# Patient Record
(demographics unavailable — no encounter records)

---

## 2025-01-16 NOTE — HISTORY OF PRESENT ILLNESS
[FreeTextEntry1] : 87 year old male with Dementia/HTN/HLD/T2DM/dysphagia found to have lymphocytic esophagitis in 2019, never started written budesonide in 2019, then was seen once in 2022 and was lost to follow-up until now here for complaints of recurrent dysphagia.  Daughter states that dysphagia is intermittent, unclear if both solids and liquids.  Daughter states that he will be fine for a while, and then have some episodes of choking.  He has not had any weight loss.  He also has an element of oropharyngeal dysphagia as seen on SLP eval in 2018 with some silent penetration without retrieval of thin liquids.  He also had a barium esophagram which demonstrated tablet hung up at the thoracic esophagus at that time. He currently is only taking pantoprazole once daily.

## 2025-01-16 NOTE — ASSESSMENT
[FreeTextEntry1] : 87-year-old male with HTN/HLD/T2DM/history of lymphocytic esophagitis here for resumption of GI care, last seen in 2022.  Patient with intermittent dysphagia to solids and liquids.  I recommend that he undergo barium esophagram with tablet challenge.  He will need an upper endoscopy with biopsies.  I have asked him to increase his pantoprazole to twice daily in the interim.

## 2025-05-12 NOTE — ASSESSMENT
[FreeTextEntry1] : 87 year old male with Dementia/HTN/HLD/T2DM/dysphagia found to have lymphocytic esophagitis in 2019, never started written budesonide in 2019, s/p EGD w/ dilation 4/2/2025 and candidal esophagitis s/p fluconazole, presenting as a follow up visit.   #Lymphocytic Esophagitis #Esophageal stenosis - likely 2/2 lymphocytic esophagitis s/p dilation 15 mm 4/2/2025 #Candidal Esophagitis - on path from last EGD, s/p fluconazole Patient reporting significant improvement in symptoms, no further dysphagia post dilation. Patient also w/ minimal reflux while on pantoprazole 40 mg QD. During last EGD path was + for candida however no evidence of lymphocytic esophagitis at that time, may be in the setting of burn out lymphocytic esophagitis, though w/ associated stricturing disease.   Recommendations: - Continue pantoprazole 40 mg QD  - Can hold off on repeating EGD for now given resolution of symptoms - Fluconazole to be completed tomorrow

## 2025-05-12 NOTE — PHYSICAL EXAM
[Alert] : alert [No Respiratory Distress] : no respiratory distress [Respiration, Rhythm And Depth] : normal respiratory rhythm and effort

## 2025-05-12 NOTE — HISTORY OF PRESENT ILLNESS
[FreeTextEntry1] : 87 year old male with Dementia/HTN/HLD/T2DM/dysphagia found to have lymphocytic esophagitis in 2019, never started written budesonide in 2019, s/p EGD w/ dilation 4/2/2025 and candidal esophagitis s/p fluconazole, presenting as a follow up visit.   The patient underwent EGD 4/2/2025 demonstrating cricopharyngeal web as well as evidence of edema, moderate rings, scattered white exudates, no linear furrows. The patient had evidence of esphoageal stenosis, dilated with a 12-13.5-15 mm balloon dilator, dilated to 15 mm, w/ 3 hemostatic flips placed to prevent further bleeding. Pathology from esophageal biopsies demonstrated candidal esophagitis, the patient was started on 21 day course of fluconazole.  Since the last endoscopy the patient has had resolution of all symptoms, has been having a regular diet eating pork, chicken, rice without any issues. The patient denies any further dysphagia, denies any reflux symptoms. No chest pain, regurgitation.

## 2025-05-12 NOTE — END OF VISIT
[] : Fellow [FreeTextEntry3] : 87-year-old male with dementia/HTN/HLD/T2DM/dysphagia with lymphocytic esophagitis 2019, seen in January 2025 for significant dysphagia solids/liquids status post upper endoscopy with CRE dilation up to 15 mm with moderate size tear and 3 hemostatic clip placement.  Patient had chest pain for 72 hours after dilation, was taking liquid Tylenol as well as PPI twice daily.  He has recently switched to PPI once daily after 2 weeks, he was also found to have Candida esophagitis and is completing fluconazole liquid up to his last day.  In terms of dysphagia, no further dysphagia to solids or liquids patient has gained 10 pounds.  He denies any reflux or regurgitation symptoms.  Recommendation 1.  Esophageal stricture due to history of lymphocytic esophagitis, now quiescent on biopsies 2.  Continue with PPI daily 3.  Follow-up with me in 1 year